# Patient Record
Sex: MALE | Race: ASIAN | URBAN - METROPOLITAN AREA
[De-identification: names, ages, dates, MRNs, and addresses within clinical notes are randomized per-mention and may not be internally consistent; named-entity substitution may affect disease eponyms.]

---

## 2021-11-29 ENCOUNTER — TELEPHONE (OUTPATIENT)
Dept: BEHAVIORAL HEALTH | Facility: CLINIC | Age: 21
End: 2021-11-29

## 2021-11-29 ASSESSMENT — ANXIETY QUESTIONNAIRES
4. TROUBLE RELAXING: MORE THAN HALF THE DAYS
GAD7 TOTAL SCORE: 6
6. BECOMING EASILY ANNOYED OR IRRITABLE: NOT AT ALL
7. FEELING AFRAID AS IF SOMETHING AWFUL MIGHT HAPPEN: MORE THAN HALF THE DAYS
5. BEING SO RESTLESS THAT IT IS HARD TO SIT STILL: NOT AT ALL
2. NOT BEING ABLE TO STOP OR CONTROL WORRYING: NOT AT ALL
7. FEELING AFRAID AS IF SOMETHING AWFUL MIGHT HAPPEN: MORE THAN HALF THE DAYS
3. WORRYING TOO MUCH ABOUT DIFFERENT THINGS: MORE THAN HALF THE DAYS
1. FEELING NERVOUS, ANXIOUS, OR ON EDGE: NOT AT ALL
8. IF YOU CHECKED OFF ANY PROBLEMS, HOW DIFFICULT HAVE THESE MADE IT FOR YOU TO DO YOUR WORK, TAKE CARE OF THINGS AT HOME, OR GET ALONG WITH OTHER PEOPLE?: SOMEWHAT DIFFICULT

## 2021-11-29 ASSESSMENT — PATIENT HEALTH QUESTIONNAIRE - PHQ9
SUM OF ALL RESPONSES TO PHQ QUESTIONS 1-9: 0
SUM OF ALL RESPONSES TO PHQ QUESTIONS 1-9: 0
10. IF YOU CHECKED OFF ANY PROBLEMS, HOW DIFFICULT HAVE THESE PROBLEMS MADE IT FOR YOU TO DO YOUR WORK, TAKE CARE OF THINGS AT HOME, OR GET ALONG WITH OTHER PEOPLE: NOT DIFFICULT AT ALL

## 2021-11-29 NOTE — TELEPHONE ENCOUNTER
11/29/21 Received call from Pt requesting a CD Eval for Marijuana use, Video Visit 12/1/21. [place]

## 2021-11-30 ENCOUNTER — TELEPHONE (OUTPATIENT)
Dept: BEHAVIORAL HEALTH | Facility: CLINIC | Age: 21
End: 2021-11-30
Payer: COMMERCIAL

## 2021-11-30 ASSESSMENT — ANXIETY QUESTIONNAIRES: GAD7 TOTAL SCORE: 6

## 2021-11-30 ASSESSMENT — PATIENT HEALTH QUESTIONNAIRE - PHQ9: SUM OF ALL RESPONSES TO PHQ QUESTIONS 1-9: 0

## 2021-12-01 ENCOUNTER — DOCUMENTATION ONLY (OUTPATIENT)
Dept: OTHER | Facility: CLINIC | Age: 21
End: 2021-12-01
Payer: COMMERCIAL

## 2021-12-01 ENCOUNTER — HOSPITAL ENCOUNTER (OUTPATIENT)
Dept: BEHAVIORAL HEALTH | Facility: CLINIC | Age: 21
Discharge: HOME | End: 2021-12-01
Attending: FAMILY MEDICINE | Admitting: FAMILY MEDICINE
Payer: COMMERCIAL

## 2021-12-01 VITALS — WEIGHT: 224 LBS | BODY MASS INDEX: 33.95 KG/M2 | HEIGHT: 68 IN

## 2021-12-01 PROCEDURE — H0001 ALCOHOL AND/OR DRUG ASSESS: HCPCS | Mod: GT,95

## 2021-12-01 ASSESSMENT — COLUMBIA-SUICIDE SEVERITY RATING SCALE - C-SSRS
1. IN THE PAST MONTH, HAVE YOU WISHED YOU WERE DEAD OR WISHED YOU COULD GO TO SLEEP AND NOT WAKE UP?: NO
2. HAVE YOU ACTUALLY HAD ANY THOUGHTS OF KILLING YOURSELF?: NO
1. IN THE PAST MONTH, HAVE YOU WISHED YOU WERE DEAD OR WISHED YOU COULD GO TO SLEEP AND NOT WAKE UP?: NO
2. HAVE YOU ACTUALLY HAD ANY THOUGHTS OF KILLING YOURSELF LIFETIME?: NO

## 2021-12-01 ASSESSMENT — PAIN SCALES - GENERAL: PAINLEVEL: NO PAIN (0)

## 2021-12-01 ASSESSMENT — MIFFLIN-ST. JEOR: SCORE: 1995.56

## 2021-12-01 NOTE — TELEPHONE ENCOUNTER
Patient has a virtual KAREY eval today, 12/1/2021 at 1030. Spoke with patient and checked them in for this appointment. Emergency contact and e-mail verified. Their legal guardian status has not been confirmed by [employer] and patient confirmed they do not have any legal guardian. An [facility] was [facility] to [facility] to remove that status. Today's appointment will be done via [employer] video.

## 2021-12-01 NOTE — PROGRESS NOTES
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    The patient did not identify having any cultural concerns regarding mental health, physical health, or substance use issues.     5. Recommendations for treatment focus:      Alcohol / Substance Use - See #2. KAERY Referrals above for details on recommendations.    Provider Name/ Credentials:  KIRBY Page  December 1, 2021

## 2021-12-01 NOTE — TELEPHONE ENCOUNTER
Raj Vlaencia Jr. Release of Information requests were e-mailed to the [place] [id number] OB's at [email or web address] on 12/1/2021 with requests for the following releases:    Patient's e-mail address: [email or web address]    1.) KAREY - [id number] - Collateral Contact & Emergency Contact   Deann Mazariegos (mother)  Tel #: [phone]    2.) KAREY - [id number] - Exchange of MH & KAREY Records  [facility]  Tel #: [phone]  Fax #: [phone]    3.) KAREY - [id number] - Exchange of MH & KAREY Records  [facility]  Tel #: [phone]  Fax #: [phone]    4.) KAREY - [id number] -  SAE Morales ([facility])  Tel #: [phone]  Fax #: [phone]

## 2021-12-02 NOTE — TELEPHONE ENCOUNTER
This patient's 12/1/2021 substance use disorder assessment was faxed to Sri Morales ([facility]) on 12/2/2021.

## 2021-12-02 NOTE — TELEPHONE ENCOUNTER
This patient's 12/1/2021 substance use disorder assessment was faxed to [facility] on 12/2/2021 as a referral for an intensive outpatient substance use disorder treatment.

## 2021-12-02 NOTE — TELEPHONE ENCOUNTER
This patient's 12/1/2021 substance use disorder assessment was faxed to [facility] on 12/2/2021 as a referral for an intensive outpatient substance use disorder treatment.

## 2021-12-02 NOTE — TELEPHONE ENCOUNTER
A voice message was left for this patient's [facility] , Sri Morales, on 12/2/2021 at 3:30 pm instructing her to call this counselor back.  The purpose of the call was to obtain collateral information needed to complete the patient's substance use disorder assessment.  This counselor will await a return call from Sri.

## 2021-12-02 NOTE — TELEPHONE ENCOUNTER
This patient's KAREY Assessment DAANES information was entered directly into the [place] [employer] DAANES website on 12/2/2021.  Assessment ID: [id number]

## 2022-01-22 ENCOUNTER — HEALTH MAINTENANCE LETTER (OUTPATIENT)
Age: 22
End: 2022-01-22

## 2022-09-03 ENCOUNTER — HEALTH MAINTENANCE LETTER (OUTPATIENT)
Age: 22
End: 2022-09-03

## 2023-01-15 ENCOUNTER — HEALTH MAINTENANCE LETTER (OUTPATIENT)
Age: 23
End: 2023-01-15

## 2024-02-17 ENCOUNTER — HEALTH MAINTENANCE LETTER (OUTPATIENT)
Age: 24
End: 2024-02-17